# Patient Record
Sex: FEMALE | Race: ASIAN
[De-identification: names, ages, dates, MRNs, and addresses within clinical notes are randomized per-mention and may not be internally consistent; named-entity substitution may affect disease eponyms.]

---

## 2019-07-18 ENCOUNTER — HOSPITAL ENCOUNTER (EMERGENCY)
Dept: HOSPITAL 53 - M ED | Age: 24
LOS: 1 days | Discharge: HOME | End: 2019-07-19
Payer: COMMERCIAL

## 2019-07-18 VITALS — BODY MASS INDEX: 26.87 KG/M2 | HEIGHT: 59 IN | WEIGHT: 133.27 LBS

## 2019-07-18 DIAGNOSIS — R19.7: ICD-10-CM

## 2019-07-18 DIAGNOSIS — K21.9: ICD-10-CM

## 2019-07-18 DIAGNOSIS — R11.0: ICD-10-CM

## 2019-07-18 DIAGNOSIS — Z79.3: ICD-10-CM

## 2019-07-18 DIAGNOSIS — R10.2: Primary | ICD-10-CM

## 2019-07-18 LAB
ALBUMIN SERPL BCG-MCNC: 3.9 GM/DL (ref 3.2–5.2)
ALT SERPL W P-5'-P-CCNC: 70 U/L (ref 12–78)
B-HCG SERPL QL: NEGATIVE
BILIRUB SERPL-MCNC: 0.3 MG/DL (ref 0.2–1)
BUN SERPL-MCNC: 10 MG/DL (ref 7–18)
CALCIUM SERPL-MCNC: 9.1 MG/DL (ref 8.5–10.1)
CHLORIDE SERPL-SCNC: 107 MEQ/L (ref 98–107)
CO2 SERPL-SCNC: 23 MEQ/L (ref 21–32)
CREAT SERPL-MCNC: 0.69 MG/DL (ref 0.55–1.3)
GFR SERPL CREATININE-BSD FRML MDRD: > 60 ML/MIN/{1.73_M2} (ref 60–?)
GLUCOSE SERPL-MCNC: 132 MG/DL (ref 70–100)
HCT VFR BLD AUTO: 41.6 % (ref 36–47)
HGB BLD-MCNC: 13.9 G/DL (ref 12–15.5)
MCH RBC QN AUTO: 31.7 PG (ref 27–33)
MCHC RBC AUTO-ENTMCNC: 33.4 G/DL (ref 32–36.5)
MCV RBC AUTO: 94.8 FL (ref 80–96)
PLATELET # BLD AUTO: 290 10^3/UL (ref 150–450)
POTASSIUM SERPL-SCNC: 4.5 MEQ/L (ref 3.5–5.1)
PROT SERPL-MCNC: 7.8 GM/DL (ref 6.4–8.2)
RBC # BLD AUTO: 4.39 10^6/UL (ref 4–5.4)
SODIUM SERPL-SCNC: 140 MEQ/L (ref 136–145)
WBC # BLD AUTO: 9.6 10^3/UL (ref 4–10)

## 2019-07-19 VITALS — SYSTOLIC BLOOD PRESSURE: 118 MMHG | DIASTOLIC BLOOD PRESSURE: 74 MMHG

## 2019-07-19 NOTE — REPVR
EXAM: 

 US Pelvis Complete, Transabdominal 



EXAM DATE/TIME: 

 7/18/2019 11:36 PM 



CLINICAL HISTORY: 

 23 years old, female; Pelvic pain; Additional info: Right pelvic pain, history 

of cyst 



TECHNIQUE: 

 Imaging protocol: Real-time transabdominal pelvic ultrasound with image 

documentation. Complete exam. 



COMPARISON: 

 No relevant prior studies available. 



FINDINGS: 

 Uterus/cervix: 7.9 x 3.4 x 4.5 cm.  Normal endometrial thickness, measuring 

approximately 5 mm. 

 Right ovary: 2.5 x 1.7 x 2.4 cm. No mass. Normal blood flow. 

 Left ovary: 2.6 x 1.5 x 2.2 cm. No mass. Normal blood flow. 

 Free fluid: None. 

 Bladder: Normal. 



IMPRESSION: 

No acute sonographic findings. 



Electronically signed by: Lion Alvarado On 07/19/2019  00:18:46 AM

## 2020-06-10 ENCOUNTER — HOSPITAL ENCOUNTER (EMERGENCY)
Dept: HOSPITAL 53 - M ED | Age: 25
Discharge: HOME | End: 2020-06-10
Payer: COMMERCIAL

## 2020-06-10 VITALS — DIASTOLIC BLOOD PRESSURE: 80 MMHG | SYSTOLIC BLOOD PRESSURE: 136 MMHG

## 2020-06-10 VITALS — BODY MASS INDEX: 27.27 KG/M2 | HEIGHT: 59 IN | WEIGHT: 135.28 LBS

## 2020-06-10 DIAGNOSIS — Z79.3: ICD-10-CM
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
## 2020-06-10 DIAGNOSIS — S30.0XXA: ICD-10-CM

## 2020-06-10 DIAGNOSIS — J45.909: ICD-10-CM

## 2020-06-10 DIAGNOSIS — S93.402A: Primary | ICD-10-CM

## 2020-06-10 DIAGNOSIS — M25.561: ICD-10-CM

## 2020-06-10 DIAGNOSIS — N28.89: ICD-10-CM
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
## 2020-06-10 LAB
B-HCG SERPL QL: NEGATIVE
BASOPHILS # BLD AUTO: 0.1 10^3/UL (ref 0–0.2)
BASOPHILS NFR BLD AUTO: 0.5 % (ref 0–1)
BUN SERPL-MCNC: 11 MG/DL (ref 7–18)
CALCIUM SERPL-MCNC: 8.6 MG/DL (ref 8.5–10.1)
CHLORIDE SERPL-SCNC: 106 MEQ/L (ref 98–107)
CO2 SERPL-SCNC: 25 MEQ/L (ref 21–32)
CREAT SERPL-MCNC: 0.72 MG/DL (ref 0.55–1.3)
EOSINOPHIL # BLD AUTO: 0.3 10^3/UL (ref 0–0.5)
EOSINOPHIL NFR BLD AUTO: 3.4 % (ref 0–3)
GFR SERPL CREATININE-BSD FRML MDRD: > 60 ML/MIN/{1.73_M2} (ref 60–?)
GLUCOSE SERPL-MCNC: 125 MG/DL (ref 70–100)
HCT VFR BLD AUTO: 43 % (ref 36–47)
HGB BLD-MCNC: 14.3 G/DL (ref 12–15.5)
LYMPHOCYTES # BLD AUTO: 3.5 10^3/UL (ref 1.5–5)
LYMPHOCYTES NFR BLD AUTO: 36.2 % (ref 24–44)
MCH RBC QN AUTO: 30.6 PG (ref 27–33)
MCHC RBC AUTO-ENTMCNC: 33.3 G/DL (ref 32–36.5)
MCV RBC AUTO: 92.1 FL (ref 80–96)
MONOCYTES # BLD AUTO: 0.7 10^3/UL (ref 0–0.8)
MONOCYTES NFR BLD AUTO: 6.7 % (ref 0–5)
NEUTROPHILS # BLD AUTO: 5.2 10^3/UL (ref 1.5–8.5)
NEUTROPHILS NFR BLD AUTO: 52.7 % (ref 36–66)
PLATELET # BLD AUTO: 311 10^3/UL (ref 150–450)
POTASSIUM SERPL-SCNC: 4.3 MEQ/L (ref 3.5–5.1)
RBC # BLD AUTO: 4.67 10^6/UL (ref 4–5.4)
SODIUM SERPL-SCNC: 141 MEQ/L (ref 136–145)
WBC # BLD AUTO: 9.8 10^3/UL (ref 4–10)

## 2020-06-10 PROCEDURE — 93041 RHYTHM ECG TRACING: CPT

## 2020-06-10 PROCEDURE — 72128 CT CHEST SPINE W/O DYE: CPT

## 2020-06-10 PROCEDURE — 85025 COMPLETE CBC W/AUTO DIFF WBC: CPT

## 2020-06-10 PROCEDURE — 99285 EMERGENCY DEPT VISIT HI MDM: CPT

## 2020-06-10 PROCEDURE — 71260 CT THORAX DX C+: CPT

## 2020-06-10 PROCEDURE — 73610 X-RAY EXAM OF ANKLE: CPT

## 2020-06-10 PROCEDURE — 72125 CT NECK SPINE W/O DYE: CPT

## 2020-06-10 PROCEDURE — 84703 CHORIONIC GONADOTROPIN ASSAY: CPT

## 2020-06-10 PROCEDURE — 96374 THER/PROPH/DIAG INJ IV PUSH: CPT

## 2020-06-10 PROCEDURE — 74177 CT ABD & PELVIS W/CONTRAST: CPT

## 2020-06-10 PROCEDURE — 70450 CT HEAD/BRAIN W/O DYE: CPT

## 2020-06-10 PROCEDURE — 73564 X-RAY EXAM KNEE 4 OR MORE: CPT

## 2020-06-10 PROCEDURE — 80047 BASIC METABLC PNL IONIZED CA: CPT

## 2020-06-10 PROCEDURE — 72131 CT LUMBAR SPINE W/O DYE: CPT

## 2020-06-10 PROCEDURE — 94760 N-INVAS EAR/PLS OXIMETRY 1: CPT

## 2020-06-10 PROCEDURE — 36415 COLL VENOUS BLD VENIPUNCTURE: CPT

## 2020-06-10 PROCEDURE — 96361 HYDRATE IV INFUSION ADD-ON: CPT

## 2020-06-10 PROCEDURE — 80048 BASIC METABOLIC PNL TOTAL CA: CPT

## 2020-06-10 NOTE — REPVR
PROCEDURE INFORMATION: 

Exam: CT Head Without Contrast 

Exam date and time: 6/10/2020 2:28 AM 

Age: 24 years old 

Clinical indication: Injury or trauma; Fall; Initial encounter; Concussion / 

head injury; Injury details: Fell out of tub 



TECHNIQUE: 

Imaging protocol: Computed tomography of the head without contrast. 

Radiation optimization: All CT scans at this facility use at least one of these 

dose optimization techniques: automated exposure control; mA and/or kV 

adjustment per patient size (includes targeted exams where dose is matched to 

clinical indication); or iterative reconstruction. 



COMPARISON: 

No relevant prior studies available. 



FINDINGS: 

Brain: Normal. No hemorrhage. Unremarkable white matter. No mass effect. 

Ventricles: Normal. No ventriculomegaly. 

Bones/joints: Unremarkable. No acute fracture. 

Sinuses: Visualized sinuses are unremarkable. No fluid levels. 

Mastoid air cells: Visualized mastoid air cells are well aerated. 



Soft tissues: Unremarkable. 



IMPRESSION: 

No acute intracranial abnormality. 



Electronically signed by: Jose Kirby On 06/10/2020  03:42:41 AM

## 2020-06-10 NOTE — REPVR
PROCEDURE INFORMATION: 

Exam: CT Lumbar Spine Without Contrast 

Exam date and time: 6/10/2020 2:28 AM 

Age: 24 years old 

Clinical indication: Low back pain; Patient HX: Fell out of tub; Additional 

info: Trauma 



TECHNIQUE: 

Imaging protocol: Computed tomography images of the lumbar spine without 

contrast. 

Radiation optimization: All CT scans at this facility use at least one of these 

dose optimization techniques: automated exposure control; mA and/or kV 

adjustment per patient size (includes targeted exams where dose is matched to 

clinical indication); or iterative reconstruction. 



COMPARISON: 

No relevant prior studies available. 



FINDINGS: 

Vertebrae: No acute fracture. Vertebrae are normally aligned. Normal vertebral 

body heights. Mild lumbar levoscoliosis. 

Discs/Spinal canal/Neural foramina: No acute findings. No severe stenoses. 



Soft tissues: Unremarkable. 



IMPRESSION: 

No acute findings. 



Electronically signed by: Moustapha Dixon On 06/10/2020  04:02:46 AM

## 2020-06-10 NOTE — REPVR
PROCEDURE INFORMATION: 

Exam: CT Cervical Spine Without Contrast 

Exam date and time: 6/10/2020 2:28 AM 

Age: 24 years old 

Clinical indication: Neck pain; Patient HX: Fell out of tub; Additional info: 

Trauma 



TECHNIQUE: 

Imaging protocol: Computed tomography images of the cervical spine without 

contrast. 

Radiation optimization: All CT scans at this facility use at least one of these 

dose optimization techniques: automated exposure control; mA and/or kV 

adjustment per patient size (includes targeted exams where dose is matched to 

clinical indication); or iterative reconstruction. 



COMPARISON: 

No relevant prior studies available. 



FINDINGS: 

Vertebrae: Nonspecific straightening. Vertebral body height and AP alignment is 

preserved. No acute cervical spine fracture. 

Discs/Spinal canal/Neural foramina: No definite significant central canal 

stenosis. 



Soft tissues: Unremarkable. 

Lungs: Lung apices are normal. 

Pleural space: No visible pneumothorax. 



IMPRESSION: 

No acute osseous abnormality. 



Electronically signed by: Jose Kirby On 06/10/2020  03:45:27 AM

## 2020-06-10 NOTE — REPVR
PROCEDURE INFORMATION: 

Exam: CT Chest With Contrast 

Exam date and time: 6/10/2020 2:28 AM 

Age: 24 years old 

Clinical indication: Chest pain; Type not specified; Patient HX: Fell out of 

tub; Additional info: Trauma 



TECHNIQUE: 

Imaging protocol: Computed tomography of the chest with intravenous contrast. 

Radiation optimization: All CT scans at this facility use at least one of these 

dose optimization techniques: automated exposure control; mA and/or kV 

adjustment per patient size (includes targeted exams where dose is matched to 

clinical indication); or iterative reconstruction. 

Contrast material: ISO; Contrast volume: 100 ml; Contrast route: AC;  



COMPARISON: 

No relevant prior studies available. 



FINDINGS: 

Lungs: No consolidation. No mass. 

Pleural space: No pneumothorax. No pleural effusion. 

Heart: No cardiomegaly. No pericardial effusion. 

Aorta: No thoracic aortic aneurysm. 

Lymph nodes: Unremarkable. No enlarged lymph nodes. 



Liver: Diffusely hypodense liver consistent with hepatic steatosis. 

Bones/joints: Unremarkable. No acute fracture. 

Soft tissues: Unremarkable. 



IMPRESSION: 

No acute findings. 



Electronically signed by: Moustapha Dixon On 06/10/2020  03:46:54 AM

## 2020-06-10 NOTE — REPVR
PROCEDURE INFORMATION: 

Exam: CT Thoracic Spine Without Contrast 

Exam date and time: 6/10/2020 2:28 AM 

Age: 24 years old 

Clinical indication: Pain in thoracic spine; Without myelpathy or 

radiculopathy; Patient HX: Fell out of tub; Additional info: Trauma 



TECHNIQUE: 

Imaging protocol: Computed tomography images of the thoracic spine without 

contrast. 

Radiation optimization: All CT scans at this facility use at least one of these 

dose optimization techniques: automated exposure control; mA and/or kV 

adjustment per patient size (includes targeted exams where dose is matched to 

clinical indication); or iterative reconstruction. 



COMPARISON: 

No relevant prior studies available. 



FINDINGS: 

Vertebrae: No acute fracture. Normal alignment. 

Discs/Spinal canal/Neural foramina: No acute findings. 



Soft tissues: Unremarkable. 

Liver: Diffusely hypodense liver consistent with hepatic steatosis. 



IMPRESSION: 

No acute findings. 



Electronically signed by: Moustapha Dixon On 06/10/2020  03:43:22 AM

## 2020-06-10 NOTE — REPVR
PROCEDURE INFORMATION: 

Exam: CT Abdomen And Pelvis With Contrast 

Exam date and time: 6/10/2020 2:28 AM 

Age: 24 years old 

Clinical indication: Abdominal pain; Generalized; Patient HX: Fell out of tub; 

Additional info: Trauma 



TECHNIQUE: 

Imaging protocol: Computed tomography of the abdomen and pelvis with 

intravenous contrast. 

Radiation optimization: All CT scans at this facility use at least one of these 

dose optimization techniques: automated exposure control; mA and/or kV 

adjustment per patient size (includes targeted exams where dose is matched to 

clinical indication); or iterative reconstruction. 

Contrast material: ISO; Contrast volume: 100 ml; Contrast route: AC;  



COMPARISON: 

US PELVIC NON-OB COMPLETE 7/18/2019 11:33 PM 



FINDINGS: 

Liver: Hepatic steatosis. Hepatomegaly measures 21 centimetres. 

Gallbladder and bile ducts: Normal. No calcified stones. No ductal dilation. 

Pancreas: Normal. No ductal dilation. 

Spleen: Normal. No splenomegaly. 

Adrenals: Normal. No mass. 

Kidneys and ureters: Indeterminate right renal hypodensity measures 9 mm. No 

acute renal injury. 

Stomach and bowel: Unremarkable. No obstruction. No mucosal thickening. 

Appendix: No evidence of appendicitis. 



Intraperitoneal space: Trace free fluid within the pelvis. 

Vasculature: Unremarkable. No abdominal aortic aneurysm. 

Lymph nodes: Unremarkable. No enlarged lymph nodes. 

Bladder: Unremarkable as visualized. 

Reproductive: Unremarkable as visualized. 

Bones/joints: Unremarkable. No acute fracture. 

Soft tissues: Small fat containing umbilical hernia. 



IMPRESSION: 

1. No acute abnormality. 

2. Indeterminate right renal hypodensity measuring 9 mm. Ultrasound may be 

considered. 



COMMENTS: 

Consistent with the [employer]'s Incidental Findings 

Committee white paper (J Am Jennie Radiol 2018): Any incidental renal lesion less 

than 1.0 cm or classified as too small to characterize, or any incidental 

cystic renal lesion characterized as simple-appearing, is likely benign. No 

follow-up imaging is recommended for these lesions per consensus 

recommendations based on imaging criteria. 



Electronically signed by: Jose Kirby On 06/10/2020  04:08:14 AM

## 2020-06-10 NOTE — REP
Clinical:  Left ankle trauma .

 

Technique:  AP, lateral, bilateral oblique views left ankle .

 

Findings:  No acute fracture or dislocation.  Skeletal structures and joint

spaces are intact and normal.  Ankle mortise appears stable.  No subcutaneous

emphysema or radiodense foreign body.

 

Impression:

Normal left ankle radiograph series.

No acute fracture or dislocation.

 

 

Electronically Signed by

José Luis Hernandez MD 06/10/2020 04:23 A

## 2020-06-11 NOTE — ED PDOC
Post-Departure Follow-Up


ft drum fp faxed formal report of ct abd/p for fu mlg Lundborg-Gray,Maja MD          Jun 11, 2020 13:37